# Patient Record
Sex: FEMALE | Race: BLACK OR AFRICAN AMERICAN | NOT HISPANIC OR LATINO | Employment: PART TIME | URBAN - METROPOLITAN AREA
[De-identification: names, ages, dates, MRNs, and addresses within clinical notes are randomized per-mention and may not be internally consistent; named-entity substitution may affect disease eponyms.]

---

## 2017-11-13 PROCEDURE — 99284 EMERGENCY DEPT VISIT MOD MDM: CPT

## 2017-11-14 ENCOUNTER — HOSPITAL ENCOUNTER (EMERGENCY)
Facility: HOSPITAL | Age: 52
Discharge: HOME | End: 2017-11-14
Attending: EMERGENCY MEDICINE
Payer: OTHER GOVERNMENT

## 2017-11-14 VITALS
DIASTOLIC BLOOD PRESSURE: 85 MMHG | OXYGEN SATURATION: 99 % | RESPIRATION RATE: 14 BRPM | HEART RATE: 97 BPM | TEMPERATURE: 98 F | SYSTOLIC BLOOD PRESSURE: 142 MMHG | BODY MASS INDEX: 32.93 KG/M2 | WEIGHT: 230 LBS | HEIGHT: 70 IN

## 2017-11-14 DIAGNOSIS — S02.30XA ORBITAL FLOOR (BLOW-OUT) CLOSED FRACTURE: ICD-10-CM
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
## 2017-11-14 DIAGNOSIS — S02.19XA: ICD-10-CM

## 2017-11-14 PROCEDURE — 25000003 PHARM REV CODE 250: Performed by: NURSE PRACTITIONER

## 2017-11-14 PROCEDURE — 25000003 PHARM REV CODE 250: Performed by: EMERGENCY MEDICINE

## 2017-11-14 RX ORDER — AMOXICILLIN AND CLAVULANATE POTASSIUM 875; 125 MG/1; MG/1
1 TABLET, FILM COATED ORAL 2 TIMES DAILY
Qty: 14 TABLET | Refills: 0 | Status: SHIPPED | OUTPATIENT
Start: 2017-11-14

## 2017-11-14 RX ORDER — TETRACAINE HYDROCHLORIDE 5 MG/ML
2 SOLUTION OPHTHALMIC
Status: COMPLETED | OUTPATIENT
Start: 2017-11-14 | End: 2017-11-14

## 2017-11-14 RX ORDER — IBUPROFEN 600 MG/1
600 TABLET ORAL
Status: COMPLETED | OUTPATIENT
Start: 2017-11-14 | End: 2017-11-14

## 2017-11-14 RX ADMIN — FLUORESCEIN SODIUM 1 EACH: 1 STRIP OPHTHALMIC at 05:11

## 2017-11-14 RX ADMIN — IBUPROFEN 600 MG: 600 TABLET, FILM COATED ORAL at 05:11

## 2017-11-14 RX ADMIN — TETRACAINE HYDROCHLORIDE 2 DROP: 5 SOLUTION OPHTHALMIC at 05:11
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
## 2024-10-12 ENCOUNTER — HOSPITAL ENCOUNTER (EMERGENCY)
Facility: HOSPITAL | Age: 59
Discharge: HOME | End: 2024-10-12
Attending: EMERGENCY MEDICINE
Payer: OTHER GOVERNMENT

## 2024-10-12 VITALS
DIASTOLIC BLOOD PRESSURE: 71 MMHG | BODY MASS INDEX: 33.79 KG/M2 | RESPIRATION RATE: 18 BRPM | HEIGHT: 70 IN | TEMPERATURE: 99 F | WEIGHT: 236 LBS | SYSTOLIC BLOOD PRESSURE: 128 MMHG | HEART RATE: 96 BPM | OXYGEN SATURATION: 95 %

## 2024-10-12 DIAGNOSIS — M79.10 MYALGIA: ICD-10-CM

## 2024-10-12 DIAGNOSIS — R50.9 FEVER: Primary | ICD-10-CM

## 2024-10-12 LAB
BILIRUB UR QL STRIP: NEGATIVE
CLARITY UR: CLEAR
COLOR UR: YELLOW
CTP QC/QA: YES
CTP QC/QA: YES
GLUCOSE UR QL STRIP: NEGATIVE
HGB UR QL STRIP: ABNORMAL
KETONES UR QL STRIP: NEGATIVE
LEUKOCYTE ESTERASE UR QL STRIP: NEGATIVE
MICROSCOPIC COMMENT: NORMAL
NITRITE UR QL STRIP: NEGATIVE
PH UR STRIP: 6 [PH] (ref 5–8)
POC MOLECULAR INFLUENZA A AGN: NEGATIVE
POC MOLECULAR INFLUENZA B AGN: NEGATIVE
PROT UR QL STRIP: ABNORMAL
RBC #/AREA URNS HPF: 2 /HPF (ref 0–4)
SARS-COV-2 RDRP RESP QL NAA+PROBE: NEGATIVE
SP GR UR STRIP: 1.02 (ref 1–1.03)
SQUAMOUS #/AREA URNS HPF: 0 /HPF
URN SPEC COLLECT METH UR: ABNORMAL
UROBILINOGEN UR STRIP-ACNC: NEGATIVE EU/DL
WBC #/AREA URNS HPF: 0 /HPF (ref 0–5)

## 2024-10-12 PROCEDURE — 99283 EMERGENCY DEPT VISIT LOW MDM: CPT | Mod: 25

## 2024-10-12 PROCEDURE — 81000 URINALYSIS NONAUTO W/SCOPE: CPT

## 2024-10-12 PROCEDURE — 25000003 PHARM REV CODE 250

## 2024-10-12 PROCEDURE — 87502 INFLUENZA DNA AMP PROBE: CPT

## 2024-10-12 PROCEDURE — 87635 SARS-COV-2 COVID-19 AMP PRB: CPT

## 2024-10-12 RX ORDER — BENZONATATE 200 MG/1
200 CAPSULE ORAL 3 TIMES DAILY PRN
Qty: 30 CAPSULE | Refills: 0 | Status: SHIPPED | OUTPATIENT
Start: 2024-10-12 | End: 2024-10-15 | Stop reason: SDUPTHER

## 2024-10-12 RX ORDER — ACETAMINOPHEN 500 MG
1000 TABLET ORAL
Status: COMPLETED | OUTPATIENT
Start: 2024-10-12 | End: 2024-10-12

## 2024-10-12 RX ORDER — GUAIFENESIN AND DEXTROMETHORPHAN HYDROBROMIDE 10; 100 MG/5ML; MG/5ML
5 SYRUP ORAL EVERY 6 HOURS PRN
Qty: 473 ML | Refills: 0 | Status: SHIPPED | OUTPATIENT
Start: 2024-10-12 | End: 2024-10-15 | Stop reason: SDUPTHER

## 2024-10-12 RX ORDER — FLUTICASONE PROPIONATE 50 MCG
2 SPRAY, SUSPENSION (ML) NASAL DAILY
Qty: 15.8 ML | Refills: 0 | Status: SHIPPED | OUTPATIENT
Start: 2024-10-12 | End: 2024-10-15 | Stop reason: SDUPTHER

## 2024-10-12 RX ORDER — ACETAMINOPHEN 500 MG
1000 TABLET ORAL EVERY 6 HOURS PRN
Qty: 56 TABLET | Refills: 0 | Status: SHIPPED | OUTPATIENT
Start: 2024-10-12 | End: 2024-10-15 | Stop reason: SDUPTHER

## 2024-10-12 RX ORDER — IBUPROFEN 600 MG/1
600 TABLET ORAL EVERY 6 HOURS PRN
Qty: 20 TABLET | Refills: 0 | Status: SHIPPED | OUTPATIENT
Start: 2024-10-12 | End: 2024-10-15 | Stop reason: SDUPTHER

## 2024-10-12 RX ADMIN — ACETAMINOPHEN 1000 MG: 500 TABLET, FILM COATED ORAL at 01:10

## 2024-10-12 NOTE — DISCHARGE INSTRUCTIONS

## 2024-10-12 NOTE — Clinical Note
"Brigitte RG "Radha Panchal was seen and treated in our emergency department on 10/12/2024.  She may return to work on 10/16/2024.       If you have any questions or concerns, please don't hesitate to call.      Macario Acuna, CABRERA"

## 2024-10-12 NOTE — Clinical Note
"Brigitte RG "Radha Panchal was seen and treated in our emergency department on 10/12/2024.  She may return to work on 10/22/2024.       If you have any questions or concerns, please don't hesitate to call.      Macario Acuna, CABRERA"

## 2024-10-12 NOTE — ED PROVIDER NOTES
Encounter Date: 10/12/2024    SCRIBE #1 NOTE: I, Teresa Orr, am scribing for, and in the presence of,  Macario Acuna PA-C. I have scribed the following portions of the note - Other sections scribed: HPI, ROS.       History     Chief Complaint   Patient presents with    Chills     Patient reports chills, joint pain and body aches that started today      Brigitte Panchal is a 58-year-old female, with past medical history of hyperlipidemia and vitamin D deficiency, who presents to the ED complaining of sharp right-sided middle back pain onset this morning. She states back pain is exacerbated with deep breaths. She also reports associated intermittent generalized headache, low-grade fever, and generalized arthralgias (but endorses pain is worse in bilateral knees). She also reports pain over right achilles for approximately one month. Patient did not attempt treatment for her symptoms prior to arrival. She denies back pain radiating to right buttock or lower extremity. No other alleviating or exacerbating factors. She denies diaphoresis, chills, cough, rhinorrhea, reduced appetite, dysuria, hematuria, dark urine, increased frequency, urgency, or other associated symptoms.    The history is provided by the patient. No  was used.     Review of patient's allergies indicates:   Allergen Reactions    Atarax [hydroxyzine hcl]      Past Medical History:   Diagnosis Date    Eczema     Glaucoma     High cholesterol     Vitamin D deficiency      Past Surgical History:   Procedure Laterality Date    FRACTURE SURGERY      left ankle     Family History   Problem Relation Name Age of Onset    Heart disease Mother      Diabetes Father      Cirrhosis Father      Hypertension Father      Cancer Sister          Pancreatic cancer    Lupus Sister       Social History     Tobacco Use    Smoking status: Every Day     Current packs/day: 1.00     Average packs/day: 1 pack/day for 25.0 years (25.0 ttl pk-yrs)      Types: Cigarettes    Smokeless tobacco: Never   Substance Use Topics    Alcohol use: No    Drug use: No     Review of Systems   Constitutional:  Positive for fever (low-grade, 100.1F). Negative for appetite change, chills and diaphoresis.   HENT:  Negative for facial swelling, rhinorrhea and sore throat.    Eyes:  Negative for visual disturbance.   Respiratory:  Negative for cough and shortness of breath.    Cardiovascular:  Negative for chest pain and palpitations.   Gastrointestinal:  Negative for abdominal pain, nausea and vomiting.   Genitourinary:  Negative for dysuria, frequency, hematuria and urgency.   Musculoskeletal:  Positive for arthralgias and back pain.   Skin:  Negative for rash.   Neurological:  Positive for headaches. Negative for weakness.   Hematological:  Does not bruise/bleed easily.   Psychiatric/Behavioral: Negative.         Physical Exam     Initial Vitals [10/12/24 1318]   BP Pulse Resp Temp SpO2   130/67 106 18 100.1 °F (37.8 °C) 95 %      MAP       --         Physical Exam    Nursing note and vitals reviewed.  Constitutional: Vital signs are normal. She appears well-developed and well-nourished. She is cooperative. She does not appear ill. No distress.   Well-appearing.  No acute distress.  Alert, oriented, and interactive.   HENT:   Head: Normocephalic and atraumatic.   Right Ear: Hearing and external ear normal.   Left Ear: Hearing and external ear normal.   Nose: Nose normal.   No signs of infection in the ears or throat.   Eyes: Conjunctivae and EOM are normal.   Neck: Phonation normal.   Normal range of motion.  Cardiovascular:  Regular rhythm.   Tachycardia present.   Exam reveals no friction rub.       No murmur heard.  Mildly tachycardic, heart rate 104 beats per minute.  No murmur or friction rub.   Pulmonary/Chest: Effort normal. No respiratory distress.   Respirations even and unlabored.  No adventitious sounds of breathing throughout anterior or posterior lung fields.    Abdominal: Abdomen is soft. She exhibits no distension. There is no abdominal tenderness.   Soft, nontender, nondistended. There is no rebound and no guarding.   Musculoskeletal:      Cervical back: Normal range of motion.      Comments: Mild tenderness to palpation around the right flank and right low back.  No overlying skin changes.     Neurological: She is alert and oriented to person, place, and time. GCS eye subscore is 4. GCS verbal subscore is 5. GCS motor subscore is 6.   Skin: Skin is warm. Capillary refill takes less than 2 seconds.         ED Course   Procedures  Labs Reviewed   URINALYSIS, REFLEX TO URINE CULTURE - Abnormal       Result Value    Specimen UA Urine, Clean Catch      Color, UA Yellow      Appearance, UA Clear      pH, UA 6.0      Specific Gravity, UA 1.025      Protein, UA Trace (*)     Glucose, UA Negative      Ketones, UA Negative      Bilirubin (UA) Negative      Occult Blood UA 2+ (*)     Nitrite, UA Negative      Urobilinogen, UA Negative      Leukocytes, UA Negative      Narrative:     Specimen Source->Urine   URINALYSIS MICROSCOPIC    RBC, UA 2      WBC, UA 0      Squam Epithel, UA 0      Microscopic Comment SEE COMMENT      Narrative:     Specimen Source->Urine   SARS-COV-2 RDRP GENE    POC Rapid COVID Negative       Acceptable Yes     POCT INFLUENZA A/B MOLECULAR    POC Molecular Influenza A Ag Negative      POC Molecular Influenza B Ag Negative       Acceptable Yes            Imaging Results              X-Ray Chest AP Portable (Final result)  Result time 10/12/24 14:10:03      Final result by Brendon Steele MD (10/12/24 14:10:03)                   Impression:      No acute abnormality.      Electronically signed by: Brendon Steele MD  Date:    10/12/2024  Time:    14:10               Narrative:    EXAMINATION:  XR CHEST AP PORTABLE    CLINICAL HISTORY:  Fever, unspecified    TECHNIQUE:  Single frontal view of the chest was  performed.    COMPARISON:  December 22, 2014    FINDINGS:  Lungs are clear.  No focal consolidation.  No effusion or pneumothorax.    No acute bone abnormality.                                       Medications   acetaminophen tablet 1,000 mg (1,000 mg Oral Given 10/12/24 6484)     Medical Decision Making  58-year-old female presenting to the emergency department with a chief complaint of myalgias and chills that started this morning.  States that her pain is worse in the right side of her low back.  Denies cough, rhinorrhea, other URI symptoms, chest pain, shortness of breath, or abdominal pain.  On exam, she was well-appearing and in no acute distress.  She was mildly tachycardic and her heart rate is in the high end of the normal range.  The rest of the physical exam is without concerning findings.  Mild tenderness to palpation in the right flank and right low back.    Differential diagnosis includes but is not limited to respiratory infections including COVID, flu, bronchitis, rhinosinusitis, or pneumonia, or noninfectious processes such as asthma, COPD or seasonal allergies, cystitis, pyelonephritis, urolithiasis, musculoskeletal pain, rhabdomyolysis.    COVID and flu negative.  Urinalysis with trace protein, 2+ blood, but no signs of infection.  Microscopic UA with 0 WBCs per high-powered field.  Chest x-ray negative for any acute abnormalities.  Patient was given 1 g of Tylenol with almost complete resolution of symptoms.  Stated she was feeling much better.  Unclear etiology of patient's myalgias and chills, however I suspect she was early in the course of a viral illness.  Discussed with her that I do not have a clear source of her infection and if she has the development of any other symptoms or if her symptoms worsen or do not improve over the next few days that she should return to the emergency department or go to her primary care physician for re-evaluation.  She voices her understanding.  Stable for  discharge home at this time.    Return precautions were discussed, all patient questions were answered, and the patient was agreeable to the plan of care.  She was discharged home in stable condition and will follow up with her primary care provider or return to the emergency department if her symptoms worsen or do not improve.     Amount and/or Complexity of Data Reviewed  Labs: ordered. Decision-making details documented in ED Course.  Radiology: ordered. Decision-making details documented in ED Course.    Risk  OTC drugs.  Prescription drug management.            Scribe Attestation:   Scribe #1: I performed the above scribed service and the documentation accurately describes the services I performed. I attest to the accuracy of the note.                             I, Macario Acuna PA-C, personally performed the services described in this documentation. All medical record entries made by the scribe were at my direction and in my presence. I have reviewed the chart and agree that the record reflects my personal performance and is accurate and complete.    Clinical Impression:  Final diagnoses:  [R50.9] Fever (Primary)  [M79.10] Myalgia          ED Disposition Condition    Discharge Stable          ED Prescriptions       Medication Sig Dispense Start Date End Date Auth. Provider    fluticasone propionate (FLONASE) 50 mcg/actuation nasal spray 2 sprays (100 mcg total) by Each Nostril route once daily. 15.8 mL 10/12/2024 11/11/2024 Macario Acuna PA-C    benzocaine-menthoL 6-10 mg lozenge Take 1 lozenge by mouth every 2 (two) hours as needed. 36 tablet 10/12/2024 -- Macario Acuna PA-C    dextromethorphan-guaiFENesin  mg/5 ml (ROBITUSSIN-DM)  mg/5 mL liquid Take 5 mLs by mouth every 6 (six) hours as needed (cough). 473 mL 10/12/2024 10/22/2024 Macario Acuna PA-C    benzonatate (TESSALON) 200 MG capsule Take 1 capsule (200 mg total) by mouth 3 (three) times daily as needed for Cough. 30  capsule 10/12/2024 10/22/2024 Macario Acuna, CABRERA    ibuprofen (ADVIL,MOTRIN) 600 MG tablet Take 1 tablet (600 mg total) by mouth every 6 (six) hours as needed for Pain. 20 tablet 10/12/2024 -- Macario Acuna PA-C    acetaminophen (TYLENOL) 500 MG tablet Take 2 tablets (1,000 mg total) by mouth every 6 (six) hours as needed for Pain. 56 tablet 10/12/2024 10/19/2024 Macario Acuna PA-C          Follow-up Information       Follow up With Specialties Details Why Contact Info    Nick Escobar MD Orthopedic Surgery Schedule an appointment as soon as possible for a visit  As needed, If symptoms worsen [id number] [place] [id number] [phone]               Macario Acuna PA-C  10/12/24 1220

## 2024-10-15 ENCOUNTER — TELEPHONE (OUTPATIENT)
Dept: EMERGENCY MEDICINE | Facility: HOSPITAL | Age: 59
End: 2024-10-15
Payer: OTHER GOVERNMENT

## 2024-10-15 RX ORDER — BENZONATATE 200 MG/1
200 CAPSULE ORAL 3 TIMES DAILY PRN
Qty: 30 CAPSULE | Refills: 0 | Status: SHIPPED | OUTPATIENT
Start: 2024-10-15 | End: 2024-10-25

## 2024-10-15 RX ORDER — GUAIFENESIN AND DEXTROMETHORPHAN HYDROBROMIDE 10; 100 MG/5ML; MG/5ML
5 SYRUP ORAL EVERY 6 HOURS PRN
Qty: 473 ML | Refills: 0 | Status: SHIPPED | OUTPATIENT
Start: 2024-10-15 | End: 2024-10-25

## 2024-10-15 RX ORDER — IBUPROFEN 600 MG/1
600 TABLET ORAL EVERY 6 HOURS PRN
Qty: 20 TABLET | Refills: 0 | Status: SHIPPED | OUTPATIENT
Start: 2024-10-15

## 2024-10-15 RX ORDER — ACETAMINOPHEN 500 MG
1000 TABLET ORAL EVERY 6 HOURS PRN
Qty: 56 TABLET | Refills: 0 | Status: SHIPPED | OUTPATIENT
Start: 2024-10-15 | End: 2024-10-22

## 2024-10-15 RX ORDER — FLUTICASONE PROPIONATE 50 MCG
2 SPRAY, SUSPENSION (ML) NASAL DAILY
Qty: 15.8 ML | Refills: 0 | Status: SHIPPED | OUTPATIENT
Start: 2024-10-15 | End: 2024-11-14